# Patient Record
Sex: MALE | Race: OTHER | HISPANIC OR LATINO | URBAN - METROPOLITAN AREA
[De-identification: names, ages, dates, MRNs, and addresses within clinical notes are randomized per-mention and may not be internally consistent; named-entity substitution may affect disease eponyms.]

---

## 2022-09-07 ENCOUNTER — EMERGENCY (EMERGENCY)
Facility: HOSPITAL | Age: 47
LOS: 1 days | Discharge: HOME | End: 2022-09-07
Attending: STUDENT IN AN ORGANIZED HEALTH CARE EDUCATION/TRAINING PROGRAM | Admitting: STUDENT IN AN ORGANIZED HEALTH CARE EDUCATION/TRAINING PROGRAM

## 2022-09-07 VITALS
OXYGEN SATURATION: 100 % | RESPIRATION RATE: 18 BRPM | HEART RATE: 51 BPM | SYSTOLIC BLOOD PRESSURE: 139 MMHG | DIASTOLIC BLOOD PRESSURE: 84 MMHG | TEMPERATURE: 98 F

## 2022-09-07 LAB
ALBUMIN SERPL ELPH-MCNC: 4.4 G/DL — SIGNIFICANT CHANGE UP (ref 3.3–5)
ALP SERPL-CCNC: 68 U/L — SIGNIFICANT CHANGE UP (ref 40–120)
ALT FLD-CCNC: 28 U/L — SIGNIFICANT CHANGE UP (ref 4–41)
ANION GAP SERPL CALC-SCNC: 13 MMOL/L — SIGNIFICANT CHANGE UP (ref 7–14)
APTT BLD: 31.1 SEC — SIGNIFICANT CHANGE UP (ref 27–36.3)
AST SERPL-CCNC: 25 U/L — SIGNIFICANT CHANGE UP (ref 4–40)
BASOPHILS # BLD AUTO: 0.05 K/UL — SIGNIFICANT CHANGE UP (ref 0–0.2)
BASOPHILS NFR BLD AUTO: 0.8 % — SIGNIFICANT CHANGE UP (ref 0–2)
BILIRUB SERPL-MCNC: 0.3 MG/DL — SIGNIFICANT CHANGE UP (ref 0.2–1.2)
BUN SERPL-MCNC: 14 MG/DL — SIGNIFICANT CHANGE UP (ref 7–23)
CALCIUM SERPL-MCNC: 9.4 MG/DL — SIGNIFICANT CHANGE UP (ref 8.4–10.5)
CHLORIDE SERPL-SCNC: 105 MMOL/L — SIGNIFICANT CHANGE UP (ref 98–107)
CO2 SERPL-SCNC: 23 MMOL/L — SIGNIFICANT CHANGE UP (ref 22–31)
CREAT SERPL-MCNC: 1.3 MG/DL — SIGNIFICANT CHANGE UP (ref 0.5–1.3)
CRP SERPL-MCNC: 4.5 MG/L — SIGNIFICANT CHANGE UP
EGFR: 68 ML/MIN/1.73M2 — SIGNIFICANT CHANGE UP
EOSINOPHIL # BLD AUTO: 0.23 K/UL — SIGNIFICANT CHANGE UP (ref 0–0.5)
EOSINOPHIL NFR BLD AUTO: 3.6 % — SIGNIFICANT CHANGE UP (ref 0–6)
ERYTHROCYTE [SEDIMENTATION RATE] IN BLOOD: 9 MM/HR — SIGNIFICANT CHANGE UP (ref 1–15)
GLUCOSE SERPL-MCNC: 91 MG/DL — SIGNIFICANT CHANGE UP (ref 70–99)
HCT VFR BLD CALC: 45.6 % — SIGNIFICANT CHANGE UP (ref 39–50)
HGB BLD-MCNC: 15.1 G/DL — SIGNIFICANT CHANGE UP (ref 13–17)
IANC: 3.78 K/UL — SIGNIFICANT CHANGE UP (ref 1.8–7.4)
IMM GRANULOCYTES NFR BLD AUTO: 0.3 % — SIGNIFICANT CHANGE UP (ref 0–1.5)
INR BLD: 1.05 RATIO — SIGNIFICANT CHANGE UP (ref 0.88–1.16)
LYMPHOCYTES # BLD AUTO: 1.98 K/UL — SIGNIFICANT CHANGE UP (ref 1–3.3)
LYMPHOCYTES # BLD AUTO: 30.9 % — SIGNIFICANT CHANGE UP (ref 13–44)
MCHC RBC-ENTMCNC: 28.1 PG — SIGNIFICANT CHANGE UP (ref 27–34)
MCHC RBC-ENTMCNC: 33.1 GM/DL — SIGNIFICANT CHANGE UP (ref 32–36)
MCV RBC AUTO: 84.8 FL — SIGNIFICANT CHANGE UP (ref 80–100)
MONOCYTES # BLD AUTO: 0.35 K/UL — SIGNIFICANT CHANGE UP (ref 0–0.9)
MONOCYTES NFR BLD AUTO: 5.5 % — SIGNIFICANT CHANGE UP (ref 2–14)
NEUTROPHILS # BLD AUTO: 3.78 K/UL — SIGNIFICANT CHANGE UP (ref 1.8–7.4)
NEUTROPHILS NFR BLD AUTO: 58.9 % — SIGNIFICANT CHANGE UP (ref 43–77)
NRBC # BLD: 0 /100 WBCS — SIGNIFICANT CHANGE UP (ref 0–0)
NRBC # FLD: 0 K/UL — SIGNIFICANT CHANGE UP (ref 0–0)
PLATELET # BLD AUTO: 213 K/UL — SIGNIFICANT CHANGE UP (ref 150–400)
POTASSIUM SERPL-MCNC: 3.8 MMOL/L — SIGNIFICANT CHANGE UP (ref 3.5–5.3)
POTASSIUM SERPL-SCNC: 3.8 MMOL/L — SIGNIFICANT CHANGE UP (ref 3.5–5.3)
PROT SERPL-MCNC: 7.6 G/DL — SIGNIFICANT CHANGE UP (ref 6–8.3)
PROTHROM AB SERPL-ACNC: 12.2 SEC — SIGNIFICANT CHANGE UP (ref 10.5–13.4)
RBC # BLD: 5.38 M/UL — SIGNIFICANT CHANGE UP (ref 4.2–5.8)
RBC # FLD: 12.8 % — SIGNIFICANT CHANGE UP (ref 10.3–14.5)
SODIUM SERPL-SCNC: 141 MMOL/L — SIGNIFICANT CHANGE UP (ref 135–145)
WBC # BLD: 6.41 K/UL — SIGNIFICANT CHANGE UP (ref 3.8–10.5)
WBC # FLD AUTO: 6.41 K/UL — SIGNIFICANT CHANGE UP (ref 3.8–10.5)

## 2022-09-07 PROCEDURE — 73610 X-RAY EXAM OF ANKLE: CPT | Mod: 26,LT

## 2022-09-07 PROCEDURE — 99284 EMERGENCY DEPT VISIT MOD MDM: CPT

## 2022-09-07 NOTE — ED PROVIDER NOTE - NS ED ATTENDING STATEMENT MOD
This was a shared visit with the [facility]. I reviewed and verified the documentation and independently performed the documented:

## 2022-09-07 NOTE — ED PROVIDER NOTE - CLINICAL SUMMARY MEDICAL DECISION MAKING FREE TEXT BOX
46 yo M here for a wound that is not healing properly since an injury 3 weeks ago. pt sustained an open wound 3 weeks ago, no suture was applied, healing by secondary intention. no fever over the last 3 weeks, wound is dry with no discharge, however pain persists. exam found wound is dry, skin level still open, but subcutaneous level is not involved. no active bleeding. minimal tenderness and swelling. no erythema.   will get crp and esr and wbc level to r/o infection. xray of left ankle to r/o gas.

## 2022-09-07 NOTE — ED PROVIDER NOTE - PHYSICAL EXAMINATION
CONSTITUTIONAL: Well-appearing; well-nourished; in no apparent distress. Non-toxic appearing.   NEURO: Alert & oriented. Gait steady without assistance. Sensory and motor functions are grossly intact.  PSYCH: Mood appropriate. Thought processes intact.   NECK: Supple  CARD: Regular rate and rhythm, no murmurs  RESP: No accessory muscle use; breath sounds clear and equal bilaterally; no wheezes, rhonchi, or rales     ABD: Soft; non-distended; non-tender.   MUSCULOSKELETAL/EXTREMITIES: FROM in all four extremities; no extremity edema. pt ambulatory with steady gait.   LEFT ankle: lateral side has laceration 3cm on skin level. dry. no bleeding. mild swelling around the wound. no discharge. no fluctuance. no erythema. full ROM of left ankle.   SKIN: Warm; dry; no apparent lesions or exudate

## 2022-09-07 NOTE — ED PROVIDER NOTE - NSFOLLOWUPINSTRUCTIONS_ED_ALL_ED_FT
Wound Care, Adult      Taking care of your wound properly can help to prevent pain, infection, and scarring. It can also help your wound heal more quickly. Follow instructions from your health care provider about how to care for your wound.      Supplies needed:    •Soap and water.  •Wound cleanser.  •Gauze.  •If needed, a clean bandage (dressing) or other type of wound dressing material to cover or place in the wound. Follow your health care provider's instructions about what dressing supplies to use.  •Cream or ointment to apply to the wound, if told by your health care provider.    How to care for your wound    Cleaning the wound     Ask your health care provider how to clean the wound. This may include:  •Using mild soap and water or a wound cleanser.  •Using a clean gauze to pat the wound dry after cleaning it. Do not rub or scrub the wound.      Dressing care    •Wash your hands with soap and water for at least 20 seconds before and after you change the dressing. If soap and water are not available, use hand .  •Change your dressing as told by your health care provider. This may include:  •Cleaning or rinsing out (irrigating) the wound.  •Placing a dressing over the wound or in the wound (packing).  •Covering the wound with an outer dressing.  •Leave any stitches (sutures), skin glue, or adhesive strips in place. These skin closures may need to stay in place for 2 weeks or longer. If adhesive strip edges start to loosen and curl up, you may trim the loose edges. Do not remove adhesive strips completely unless your health care provider tells you to do that.  •Ask your health care provider when you can leave the wound uncovered.    Checking for infection    Check your wound area every day for signs of infection. Check for:  •More redness, swelling, or pain.  •Fluid or blood.  •Warmth.  •Pus or a bad smell.      Follow these instructions at home    Medicines     •If you were prescribed an antibiotic medicine, cream, or ointment, take or apply it as told by your health care provider. Do not stop using the antibiotic even if your condition improves  •If you were prescribed pain medicine, take it 30 minutes before you do any wound care or as told by your health care provider.  •Take over-the-counter and prescription medicines only as told by your health care provider.      Eating and drinking   •Eat a diet that includes protein, vitamin A, vitamin C, and other nutrient-rich foods to help the wound heal.  •Foods rich in protein include meat, fish, eggs, dairy, beans, and nuts.  •Foods rich in vitamin A include carrots and dark green, leafy vegetables.  •Foods rich in vitamin C include citrus fruits, tomatoes, broccoli, and peppers  •Drink enough fluid to keep your urine pale yellow.    General instructions     • Do not take baths, swim, use a hot tub, or do anything that would put the wound underwater until your health care provider approves. Ask your health care provider if you may take showers. You may only be allowed to take sponge baths.  • Do not scratch or pick at the wound. Keep it covered as told by your health care provider.  •Return to your normal activities as told by your health care provider. Ask your health care provider what activities are safe for you.  •Protect your wound from the sun when you are outside for the first 6 months, or for as long as told by your health care provider. Cover up the scar area or apply sunscreen that has an SPF of at least 30.  • Do not use any products that contain nicotine or tobacco, such as cigarettes, e-cigarettes, and chewing tobacco. These may delay wound healing. If you need help quitting, ask your health care provider.  •Keep all follow-up visits as told by your health care provider. This is important.    Contact a health care provider if:    •You received a tetanus shot and you have swelling, severe pain, redness, or bleeding at the injection site.  •Your pain is not controlled with medicine.  •You have any of these signs of infection:  •More redness, swelling, or pain around the wound.  •Fluid or blood coming from the wound.  •Warmth coming from the wound.  •Pus or a bad smell coming from the wound  •A fever or chills.  •You are nauseous or you vomit.  •You are dizzy      Get help right away if:    •You have a red streak of skin near the area around your wound.  •Your wound has been closed with staples, sutures, skin glue, or adhesive strips and it begins to open up and separate.  •Your wound is bleeding, and the bleeding does not stop with gentle pressure.  •You have a rash.  •You faint.  •You have trouble breathing.      These symptoms may represent a serious problem that is an emergency. Do not wait to see if the symptoms will go away. Get medical help right away. Call your local emergency services (911 in the [place] Do not drive yourself to the hospital.       Summary    •Always wash your hands with soap and water for at least 20 seconds before and after changing your dressing.  •Change your dressing as told by your health care provider.  •To help with healing, eat foods that are rich in protein, vitamin A, vitamin C, and other nutrients.  •Check your wound every day for signs of infection. Contact your health care provider if you suspect that your wound is infected.

## 2022-09-07 NOTE — ED PROVIDER NOTE - PATIENT PORTAL LINK FT
You can access the FollowMyHealth Patient Portal offered by [facility] by registering at the following website: [email or web address]. By joining [employer]’s FollowMyHealth portal, you will also be able to view your health information using other applications (apps) compatible with our system.

## 2022-09-07 NOTE — ED PROVIDER NOTE - ATTENDING APP SHARED VISIT CONTRIBUTION OF CARE
Ruben Fountain DO:  patient seen and evaluated with the PA.  I was present for key portions of the History & Physical, and I agree with the Impression & Plan. 48 yo m pmh htn, pw pain to left lateral ankle wound. s/p laceration by metal car door 3 weeks prior. was eval at , had tdap, has been doing wound care since w/ saline and topical abx. pt comes today due to persistent pain/swelling. denies purulence, redness, f/c. reports mild pain. denies hx dm, smoking. denies n/v, cp, sob, cough, congestion, ha. arrives hds, well appearing. wound in appropriate stage of healing, 3 cm linear over left lateral malleolous, mildly swollen in area compared to right, FULL ROM of left ankle, symmetrical, dp 2+ b/l. suspect appropriate healing. very low suspicion intra articular infection/fx, given rom, no systemic s/s, and previously negative xr. labs, imaging, anticipate dc.